# Patient Record
Sex: FEMALE | Race: WHITE | NOT HISPANIC OR LATINO | Employment: FULL TIME | URBAN - METROPOLITAN AREA
[De-identification: names, ages, dates, MRNs, and addresses within clinical notes are randomized per-mention and may not be internally consistent; named-entity substitution may affect disease eponyms.]

---

## 2023-02-27 PROBLEM — E55.9 VITAMIN D DEFICIENCY: Status: ACTIVE | Noted: 2023-02-27

## 2023-02-27 PROBLEM — Z86.32 HISTORY OF GESTATIONAL DIABETES: Status: ACTIVE | Noted: 2023-02-27

## 2023-02-27 PROBLEM — J30.9 ALLERGIC RHINITIS: Status: ACTIVE | Noted: 2023-02-27

## 2023-02-27 PROBLEM — Z91.89 AT HIGH RISK FOR BREAST CANCER: Status: ACTIVE | Noted: 2023-02-27

## 2023-02-27 PROBLEM — Z00.00 ROUTINE ADULT HEALTH MAINTENANCE: Chronic | Status: ACTIVE | Noted: 2023-02-27

## 2023-02-27 PROBLEM — I10 HYPERTENSION, ESSENTIAL: Status: ACTIVE | Noted: 2023-02-27

## 2023-02-27 PROBLEM — Z00.00 ROUTINE ADULT HEALTH MAINTENANCE: Status: ACTIVE | Noted: 2023-02-27

## 2023-02-27 PROBLEM — L70.9 ACNE, MILD: Status: ACTIVE | Noted: 2023-02-27

## 2023-03-15 DIAGNOSIS — E55.9 VITAMIN D DEFICIENCY: Primary | ICD-10-CM

## 2023-03-15 RX ORDER — ERGOCALCIFEROL 1.25 MG/1
CAPSULE ORAL
COMMUNITY
End: 2023-03-15 | Stop reason: SDUPTHER

## 2023-03-15 RX ORDER — ERGOCALCIFEROL 1.25 MG/1
CAPSULE ORAL
Qty: 6 CAPSULE | Refills: 1 | Status: SHIPPED | OUTPATIENT
Start: 2023-03-15 | End: 2023-04-14 | Stop reason: SDUPTHER

## 2023-03-27 DIAGNOSIS — I10 HYPERTENSION, ESSENTIAL: ICD-10-CM

## 2023-03-27 DIAGNOSIS — L70.9 ACNE, MILD: ICD-10-CM

## 2023-03-27 RX ORDER — SPIRONOLACTONE 50 MG/1
50 TABLET, FILM COATED ORAL DAILY
Qty: 30 TABLET | Refills: 1 | Status: CANCELLED | OUTPATIENT
Start: 2023-03-27

## 2023-03-27 RX ORDER — SPIRONOLACTONE 50 MG/1
50 TABLET, FILM COATED ORAL DAILY
COMMUNITY
End: 2023-03-27 | Stop reason: SDUPTHER

## 2023-03-28 ENCOUNTER — TELEPHONE (OUTPATIENT)
Dept: PRIMARY CARE | Facility: CLINIC | Age: 48
End: 2023-03-28
Payer: COMMERCIAL

## 2023-03-28 DIAGNOSIS — Z00.00 ROUTINE MEDICAL EXAM: ICD-10-CM

## 2023-03-28 RX ORDER — SPIRONOLACTONE 50 MG/1
50 TABLET, FILM COATED ORAL DAILY
Qty: 90 TABLET | Refills: 3 | Status: SHIPPED | OUTPATIENT
Start: 2023-03-28 | End: 2023-04-14 | Stop reason: SDUPTHER

## 2023-04-14 ENCOUNTER — LAB (OUTPATIENT)
Dept: LAB | Facility: LAB | Age: 48
End: 2023-04-14
Payer: COMMERCIAL

## 2023-04-14 ENCOUNTER — OFFICE VISIT (OUTPATIENT)
Dept: PRIMARY CARE | Facility: CLINIC | Age: 48
End: 2023-04-14
Payer: COMMERCIAL

## 2023-04-14 VITALS
HEART RATE: 86 BPM | HEIGHT: 65 IN | WEIGHT: 168 LBS | SYSTOLIC BLOOD PRESSURE: 126 MMHG | DIASTOLIC BLOOD PRESSURE: 82 MMHG | BODY MASS INDEX: 27.99 KG/M2

## 2023-04-14 DIAGNOSIS — I10 HYPERTENSION, ESSENTIAL: ICD-10-CM

## 2023-04-14 DIAGNOSIS — Z91.89 AT HIGH RISK FOR BREAST CANCER: ICD-10-CM

## 2023-04-14 DIAGNOSIS — E55.9 VITAMIN D DEFICIENCY: ICD-10-CM

## 2023-04-14 DIAGNOSIS — Z12.31 ENCOUNTER FOR SCREENING MAMMOGRAM FOR BREAST CANCER: ICD-10-CM

## 2023-04-14 DIAGNOSIS — L70.9 ACNE, MILD: ICD-10-CM

## 2023-04-14 DIAGNOSIS — Z00.00 ROUTINE MEDICAL EXAM: ICD-10-CM

## 2023-04-14 DIAGNOSIS — Z00.00 ROUTINE ADULT HEALTH MAINTENANCE: Primary | Chronic | ICD-10-CM

## 2023-04-14 LAB
ALANINE AMINOTRANSFERASE (SGPT) (U/L) IN SER/PLAS: 10 U/L (ref 7–45)
ALBUMIN (G/DL) IN SER/PLAS: 4.3 G/DL (ref 3.4–5)
ALBUMIN (MG/L) IN URINE: <7 MG/L
ALBUMIN/CREATININE (UG/MG) IN URINE: NORMAL UG/MG CRT (ref 0–30)
ALKALINE PHOSPHATASE (U/L) IN SER/PLAS: 51 U/L (ref 33–110)
ANION GAP IN SER/PLAS: 11 MMOL/L (ref 10–20)
APPEARANCE, URINE: NORMAL
ASPARTATE AMINOTRANSFERASE (SGOT) (U/L) IN SER/PLAS: 16 U/L (ref 9–39)
BASOPHILS (10*3/UL) IN BLOOD BY AUTOMATED COUNT: 0.04 X10E9/L (ref 0–0.1)
BASOPHILS/100 LEUKOCYTES IN BLOOD BY AUTOMATED COUNT: 0.7 % (ref 0–2)
BILIRUBIN TOTAL (MG/DL) IN SER/PLAS: 0.6 MG/DL (ref 0–1.2)
BILIRUBIN, URINE: NEGATIVE
BLOOD, URINE: NEGATIVE
CALCIDIOL (25 OH VITAMIN D3) (NG/ML) IN SER/PLAS: 34 NG/ML
CALCIUM (MG/DL) IN SER/PLAS: 9.1 MG/DL (ref 8.6–10.3)
CARBON DIOXIDE, TOTAL (MMOL/L) IN SER/PLAS: 26 MMOL/L (ref 21–32)
CHLORIDE (MMOL/L) IN SER/PLAS: 106 MMOL/L (ref 98–107)
CHOLESTEROL (MG/DL) IN SER/PLAS: 168 MG/DL (ref 0–199)
CHOLESTEROL IN HDL (MG/DL) IN SER/PLAS: 63 MG/DL
CHOLESTEROL/HDL RATIO: 2.7
COLOR, URINE: YELLOW
CREATININE (MG/DL) IN SER/PLAS: 0.66 MG/DL (ref 0.5–1.05)
CREATININE (MG/DL) IN URINE: 95.1 MG/DL (ref 20–320)
EOSINOPHILS (10*3/UL) IN BLOOD BY AUTOMATED COUNT: 0.09 X10E9/L (ref 0–0.7)
EOSINOPHILS/100 LEUKOCYTES IN BLOOD BY AUTOMATED COUNT: 1.6 % (ref 0–6)
ERYTHROCYTE DISTRIBUTION WIDTH (RATIO) BY AUTOMATED COUNT: 12.7 % (ref 11.5–14.5)
ERYTHROCYTE MEAN CORPUSCULAR HEMOGLOBIN CONCENTRATION (G/DL) BY AUTOMATED: 32.6 G/DL (ref 32–36)
ERYTHROCYTE MEAN CORPUSCULAR VOLUME (FL) BY AUTOMATED COUNT: 93 FL (ref 80–100)
ERYTHROCYTES (10*6/UL) IN BLOOD BY AUTOMATED COUNT: 4.63 X10E12/L (ref 4–5.2)
GFR FEMALE: >90 ML/MIN/1.73M2
GLUCOSE (MG/DL) IN SER/PLAS: 85 MG/DL (ref 74–99)
GLUCOSE, URINE: NEGATIVE MG/DL
HEMATOCRIT (%) IN BLOOD BY AUTOMATED COUNT: 43 % (ref 36–46)
HEMOGLOBIN (G/DL) IN BLOOD: 14 G/DL (ref 12–16)
IMMATURE GRANULOCYTES/100 LEUKOCYTES IN BLOOD BY AUTOMATED COUNT: 0.4 % (ref 0–0.9)
KETONES, URINE: NEGATIVE MG/DL
LDL: 91 MG/DL (ref 0–99)
LEUKOCYTE ESTERASE, URINE: NEGATIVE
LEUKOCYTES (10*3/UL) IN BLOOD BY AUTOMATED COUNT: 5.5 X10E9/L (ref 4.4–11.3)
LYMPHOCYTES (10*3/UL) IN BLOOD BY AUTOMATED COUNT: 2.54 X10E9/L (ref 1.2–4.8)
LYMPHOCYTES/100 LEUKOCYTES IN BLOOD BY AUTOMATED COUNT: 45.8 % (ref 13–44)
MONOCYTES (10*3/UL) IN BLOOD BY AUTOMATED COUNT: 0.35 X10E9/L (ref 0.1–1)
MONOCYTES/100 LEUKOCYTES IN BLOOD BY AUTOMATED COUNT: 6.3 % (ref 2–10)
NEUTROPHILS (10*3/UL) IN BLOOD BY AUTOMATED COUNT: 2.5 X10E9/L (ref 1.2–7.7)
NEUTROPHILS/100 LEUKOCYTES IN BLOOD BY AUTOMATED COUNT: 45.2 % (ref 40–80)
NITRITE, URINE: NEGATIVE
PH, URINE: 7 (ref 5–8)
PLATELETS (10*3/UL) IN BLOOD AUTOMATED COUNT: 240 X10E9/L (ref 150–450)
POTASSIUM (MMOL/L) IN SER/PLAS: 4.3 MMOL/L (ref 3.5–5.3)
PROTEIN TOTAL: 6.7 G/DL (ref 6.4–8.2)
PROTEIN, URINE: NEGATIVE MG/DL
SODIUM (MMOL/L) IN SER/PLAS: 139 MMOL/L (ref 136–145)
SPECIFIC GRAVITY, URINE: 1.02 (ref 1–1.03)
THYROTROPIN (MIU/L) IN SER/PLAS BY DETECTION LIMIT <= 0.05 MIU/L: 0.98 MIU/L (ref 0.44–3.98)
TRIGLYCERIDE (MG/DL) IN SER/PLAS: 72 MG/DL (ref 0–149)
UREA NITROGEN (MG/DL) IN SER/PLAS: 11 MG/DL (ref 6–23)
UROBILINOGEN, URINE: <2 MG/DL (ref 0–1.9)
VLDL: 14 MG/DL (ref 0–40)

## 2023-04-14 PROCEDURE — 82306 VITAMIN D 25 HYDROXY: CPT

## 2023-04-14 PROCEDURE — 85025 COMPLETE CBC W/AUTO DIFF WBC: CPT

## 2023-04-14 PROCEDURE — 3074F SYST BP LT 130 MM HG: CPT | Performed by: INTERNAL MEDICINE

## 2023-04-14 PROCEDURE — 84443 ASSAY THYROID STIM HORMONE: CPT

## 2023-04-14 PROCEDURE — 82570 ASSAY OF URINE CREATININE: CPT

## 2023-04-14 PROCEDURE — 81003 URINALYSIS AUTO W/O SCOPE: CPT

## 2023-04-14 PROCEDURE — 1036F TOBACCO NON-USER: CPT | Performed by: INTERNAL MEDICINE

## 2023-04-14 PROCEDURE — 80053 COMPREHEN METABOLIC PANEL: CPT

## 2023-04-14 PROCEDURE — 82043 UR ALBUMIN QUANTITATIVE: CPT

## 2023-04-14 PROCEDURE — 36415 COLL VENOUS BLD VENIPUNCTURE: CPT

## 2023-04-14 PROCEDURE — 99396 PREV VISIT EST AGE 40-64: CPT | Performed by: INTERNAL MEDICINE

## 2023-04-14 PROCEDURE — 3079F DIAST BP 80-89 MM HG: CPT | Performed by: INTERNAL MEDICINE

## 2023-04-14 PROCEDURE — 80061 LIPID PANEL: CPT

## 2023-04-14 RX ORDER — ERGOCALCIFEROL 1.25 MG/1
CAPSULE ORAL
Qty: 6 CAPSULE | Refills: 1 | Status: SHIPPED | OUTPATIENT
Start: 2023-04-14 | End: 2023-04-14 | Stop reason: SDUPTHER

## 2023-04-14 RX ORDER — SPIRONOLACTONE 50 MG/1
50 TABLET, FILM COATED ORAL DAILY
Qty: 90 TABLET | Refills: 3 | Status: SHIPPED | OUTPATIENT
Start: 2023-04-14 | End: 2024-05-10 | Stop reason: SDUPTHER

## 2023-04-14 RX ORDER — ERGOCALCIFEROL 1.25 MG/1
CAPSULE ORAL
Qty: 6 CAPSULE | Refills: 3 | Status: SHIPPED | OUTPATIENT
Start: 2023-04-14 | End: 2024-05-10 | Stop reason: SDUPTHER

## 2023-04-14 ASSESSMENT — PATIENT HEALTH QUESTIONNAIRE - PHQ9
1. LITTLE INTEREST OR PLEASURE IN DOING THINGS: NOT AT ALL
2. FEELING DOWN, DEPRESSED OR HOPELESS: NOT AT ALL
SUM OF ALL RESPONSES TO PHQ9 QUESTIONS 1 AND 2: 0

## 2023-04-14 NOTE — PROGRESS NOTES
Reason for Visit: Annual Physical Exam    Assessment and Plan:  Problem List Items Addressed This Visit          High    Routine adult health maintenance - Primary (Chronic)    Overview     COVID-19 Pfizer 2/17/21, 3/10/21, 11/5/21, 10/25/22  Hepatitis B Adult1/1/2003   Influenza Vaccine, Split-Non 10/1/19, 9/1/20, 10/1/21, 10/25/22  Tdap (Age 7+)9/30/2011  Td 5/3/21  Mamm 3/19/18; 12/10/19; 10/8/21  MRI breast 12/28/20 (-); 11/22 (-)  MRI breast 3/20/19; 12/26/20  PAP 2/15/16 (-); PAP/HPV 6/26/20 (-)  HPV 2010 (-)  Cscope 8/2/21 (10yrs)         Current Assessment & Plan     Annual Wellness exam completed   Preventive Health history reviewed:  Vaccines today: none  Labs ordered    Mammogram ordered  Depression Screening done         Relevant Orders    Urinalysis with Reflex Microscopic    Comprehensive Metabolic Panel    CBC and Auto Differential    Lipid Panel       Medium    Acne, mild    Overview     Seeing Derm  On RetinA and topical  Aldactone started 4/21         Relevant Medications    spironolactone (Aldactone) 50 mg tablet    Other Relevant Orders    TSH with reflex to Free T4 if abnormal    At high risk for breast cancer    Overview     Five Year Risk:3.8%(Average: 0.8%)  Lifetime Risk:28.5%(Average: 12.1%)  MRI and mamm every 6months  MRI: 11/22: No MRI evidence of malignancy in either breast.  12/28/20: IMPRESSION: BENIGN FINDING   No MRI evidence of malignancy involving either breast.   Follow-up with ACR/NCCN guidelines.  2016: LEFT BREAST: BI-RADS 0  1. Lobular mass in the slightly lower slightly outer breast at a middle   depth. Possible lymph node. Second look ultrasound is recommended. If   there is no sonographic correlate, a six month follow up MRI is   recommended.  2. 6mm focal area of non-mass enhancement in the slightly upper slightly   inner quadrant at a posterior depth. Second look ultrasound may be   performed. If a clear and convincing correlate is not identified, MRI   guided biopsy is  "recommended.         Relevant Orders    MR breast bilateral w contrast full protocol    Encounter for screening mammogram for breast cancer    Relevant Orders    BI mammo bilateral screening tomosynthesis    Hypertension, essential    Overview     Goal < 130/80  5/3/21: ours:147/90 hr 73              hers: 149/96 hr 72  On Aldactone         Relevant Medications    spironolactone (Aldactone) 50 mg tablet    Other Relevant Orders    TSH with reflex to Free T4 if abnormal    Albumin, urine, random    Vitamin D deficiency    Overview     Goal 40-50  on supplement         Relevant Medications    ergocalciferol (Vitamin D-2) 1.25 MG (42867 UT) capsule    Other Relevant Orders    Vitamin D, Total         HPI: Denies any concerns today.   No new issues    ROS otherwise negative aside from what was mentioned above in HPI.    Vitals  /82   Pulse 86   Ht 1.638 m (5' 4.5\")   Wt 76.2 kg (168 lb)   BMI 28.39 kg/m²   Body mass index is 28.39 kg/m².  Physical Exam  Gen: Alert, NAD  HEENT:  Normal exam  Neck:  No masses/nodes palpable; Thyroid nontender and without nodules; No HOLLY  Respiratory:  Lungs CTAB  CV: RRR  Neuro:  Gross motor and sensory intact  Skin:  No suspicious lesions present  Breast: No masses or axillary lymphadenopathy  Gyn: Normal pelvic exam: no uterine masses or cervical lesions, or CMT; no vaginal D/C. No ovarian or adnexal masses; No external vaginal or anal/perineal lesions (Pt declined chaperone)    Active Problem List  Patient Active Problem List   Diagnosis    Acne, mild    Allergic rhinitis    History of gestational diabetes    Hypertension, essential    Vitamin D deficiency    Routine adult health maintenance    BMI 28.0-28.9,adult    At high risk for breast cancer    Encounter for screening mammogram for breast cancer       Comprehensive Medical/Surgical/Social/Family History  Past Medical History:   Diagnosis Date    H/O chest x-ray     2017 (-)    H/O diagnostic ultrasound     2/17: " IMPRESSION: NEEDLE LOCALIZATION Needle localization for the marker clip in the left breast at 11 o'clock  posterior depth 5.7 cm from the skin to the geometric center of lesion  was successful with no apparent post procedure complications    H/O magnetic resonance imaging     : No MRI evidence of malignancy in either breast. 20: IMPRESSION: BENIGN FINDING  No MRI evidence of malignancy involving either breast.  Follow-up with ACR/NCCN guidelines. 2016: LEFT BREAST: BI-RADS 0 1. Lobular mass in the slightly lower slightly outer breast at a middle  depth. Possible lymph node. Second look ultrasound is recommended.     Past Surgical History:   Procedure Laterality Date     SECTION, CLASSIC  08/10/2018     Section    COLONOSCOPY      Cscope 21: normal (10yrs)    INCISIONAL BREAST BIOPSY  2019: Left breast, excisional biopsy  - Fibrocystic changes including microcysts, fibrosis, apocrine metaplasia,  and usual ductal hyperplasia :  1. 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 (Pt's daughter was BRCA tested and was (-))    MGM:  Breast Cancer  age 70s, CVA age 90s, Uterine CA on Tamoxifen()    PGF: DVT after surgery    MGF: CVA and Brain CA ()    B:                                                            S:                                                            D: nephrectomy secondary to hydronephrosis, Hypertension       Allergies and Medications  Patient has no known allergies.  Current Outpatient Medications   Medication Instructions    ergocalciferol (Vitamin D-2) 1.25 MG (28004 UT) capsule TAKE 1 CAPSULE BY MOUTH EVERY 2 WEEKS    spironolactone (ALDACTONE) 50 mg, oral, Daily

## 2023-04-14 NOTE — ASSESSMENT & PLAN NOTE
Annual Wellness exam completed   Preventive Health history reviewed:  Vaccines today: none  Labs ordered    Mammogram ordered  Depression Screening done

## 2023-12-15 ENCOUNTER — HOSPITAL ENCOUNTER (OUTPATIENT)
Dept: RADIOLOGY | Facility: HOSPITAL | Age: 48
Discharge: HOME | End: 2023-12-15
Payer: COMMERCIAL

## 2023-12-15 DIAGNOSIS — Z91.89 AT HIGH RISK FOR BREAST CANCER: ICD-10-CM

## 2023-12-15 PROCEDURE — A9575 INJ GADOTERATE MEGLUMI 0.1ML: HCPCS | Performed by: INTERNAL MEDICINE

## 2023-12-15 PROCEDURE — 77049 MRI BREAST C-+ W/CAD BI: CPT | Mod: BILATERAL PROCEDURE | Performed by: STUDENT IN AN ORGANIZED HEALTH CARE EDUCATION/TRAINING PROGRAM

## 2023-12-15 PROCEDURE — 77049 MRI BREAST C-+ W/CAD BI: CPT

## 2023-12-15 PROCEDURE — 2550000001 HC RX 255 CONTRASTS: Performed by: INTERNAL MEDICINE

## 2023-12-15 RX ORDER — GADOTERATE MEGLUMINE 376.9 MG/ML
15 INJECTION INTRAVENOUS
Status: COMPLETED | OUTPATIENT
Start: 2023-12-15 | End: 2023-12-15

## 2023-12-15 RX ADMIN — GADOTERATE MEGLUMINE 15 ML: 376.9 INJECTION INTRAVENOUS at 11:51

## 2024-04-05 ENCOUNTER — APPOINTMENT (OUTPATIENT)
Dept: PRIMARY CARE | Facility: CLINIC | Age: 49
End: 2024-04-05
Payer: COMMERCIAL

## 2024-05-10 ENCOUNTER — OFFICE VISIT (OUTPATIENT)
Dept: PRIMARY CARE | Facility: CLINIC | Age: 49
End: 2024-05-10
Payer: COMMERCIAL

## 2024-05-10 VITALS
HEART RATE: 78 BPM | DIASTOLIC BLOOD PRESSURE: 73 MMHG | SYSTOLIC BLOOD PRESSURE: 112 MMHG | BODY MASS INDEX: 30.05 KG/M2 | WEIGHT: 176 LBS | HEIGHT: 64 IN

## 2024-05-10 DIAGNOSIS — E55.9 VITAMIN D DEFICIENCY: ICD-10-CM

## 2024-05-10 DIAGNOSIS — Z12.31 ENCOUNTER FOR SCREENING MAMMOGRAM FOR BREAST CANCER: Primary | ICD-10-CM

## 2024-05-10 DIAGNOSIS — I10 HYPERTENSION, ESSENTIAL: ICD-10-CM

## 2024-05-10 DIAGNOSIS — L70.9 ACNE, MILD: ICD-10-CM

## 2024-05-10 DIAGNOSIS — Z00.00 ROUTINE ADULT HEALTH MAINTENANCE: Chronic | ICD-10-CM

## 2024-05-10 DIAGNOSIS — Z91.89 AT HIGH RISK FOR BREAST CANCER: ICD-10-CM

## 2024-05-10 DIAGNOSIS — J30.2 SEASONAL ALLERGIC RHINITIS, UNSPECIFIED TRIGGER: ICD-10-CM

## 2024-05-10 PROCEDURE — 3074F SYST BP LT 130 MM HG: CPT | Performed by: INTERNAL MEDICINE

## 2024-05-10 PROCEDURE — 3078F DIAST BP <80 MM HG: CPT | Performed by: INTERNAL MEDICINE

## 2024-05-10 PROCEDURE — 1036F TOBACCO NON-USER: CPT | Performed by: INTERNAL MEDICINE

## 2024-05-10 PROCEDURE — 99396 PREV VISIT EST AGE 40-64: CPT | Performed by: INTERNAL MEDICINE

## 2024-05-10 RX ORDER — AZELASTINE 1 MG/ML
2 SPRAY, METERED NASAL EVERY 12 HOURS
Qty: 30 ML | Refills: 11 | Status: SHIPPED | OUTPATIENT
Start: 2024-05-10

## 2024-05-10 RX ORDER — ERGOCALCIFEROL 1.25 MG/1
CAPSULE ORAL
Qty: 6 CAPSULE | Refills: 3 | Status: SHIPPED | OUTPATIENT
Start: 2024-05-10

## 2024-05-10 RX ORDER — SPIRONOLACTONE 50 MG/1
50 TABLET, FILM COATED ORAL DAILY
Qty: 90 TABLET | Refills: 3 | Status: SHIPPED | OUTPATIENT
Start: 2024-05-10

## 2024-05-10 RX ORDER — AZELASTINE 1 MG/ML
2 SPRAY, METERED NASAL EVERY 12 HOURS
COMMUNITY
Start: 2021-08-04 | End: 2024-05-10 | Stop reason: SDUPTHER

## 2024-05-10 ASSESSMENT — PATIENT HEALTH QUESTIONNAIRE - PHQ9
1. LITTLE INTEREST OR PLEASURE IN DOING THINGS: NOT AT ALL
SUM OF ALL RESPONSES TO PHQ9 QUESTIONS 1 AND 2: 0
2. FEELING DOWN, DEPRESSED OR HOPELESS: NOT AT ALL

## 2024-05-10 NOTE — ASSESSMENT & PLAN NOTE
Annual Wellness exam completed   Preventive Health History reviewed  Ordered:   Labs    Mammogram/MRI   PAP due in 2025  Shingrix dicussed

## 2024-05-10 NOTE — PROGRESS NOTES
ANNUAL CPE VISIT  Chief Complaint   Patient presents with    Annual Exam     refills     HPI: Bp is good    Assessment and Plan:  Problem List Items Addressed This Visit          High    Routine adult health maintenance (Chronic)    Overview     COVID-19 Pfizer 2/17/21, 3/10/21, 11/5/21, 10/25/22  Hepatitis B Adult1/1/2003   Influenza Vaccine, Split-Non 10/1/19, 9/1/20, 10/1/21, 10/25/22, 10/28/23  Tdap (Age 7+)9/30/2011  Td 5/3/21  Mamm 3/19/18; 12/10/19; 10/8/21, 6/20/23  MRI breast 12/28/20 (-); 11/22 (-), 3/20/19; 12/26/20, 12/15/23 (-)  PAP 2/15/16 (-); PAP/HPV 6/26/20 (-)  HPV 2010 (-)  Cscope 8/2/21 (10yrs)         Current Assessment & Plan     Annual Wellness exam completed   Preventive Health History reviewed  Ordered:   Labs    Mammogram/MRI   PAP due in 2025  Shingrix dicussed         Relevant Orders    Comprehensive Metabolic Panel    CBC and Auto Differential    Lipid Panel    Urinalysis with Reflex Culture and Microscopic    Comprehensive Metabolic Panel    CBC and Auto Differential    Lipid Panel    Urinalysis with Reflex Culture and Microscopic       Medium    Acne, mild    Overview     Seeing Derm  On RetinA and topical  Aldactone started 4/21         Relevant Medications    spironolactone (Aldactone) 50 mg tablet    Allergic rhinitis    Overview     S/p allergy testing (-)  Saw Allergist  Uses astelin         Relevant Medications    azelastine (Astelin) 137 mcg (0.1 %) nasal spray    At high risk for breast cancer    Overview     Five Year Risk:3.8%(Average: 0.8%)  Lifetime Risk:28.5%(Average: 12.1%)  MRI and mamm every 6months  MRI: 11/22: No MRI evidence of malignancy in either breast.  12/28/20: IMPRESSION: BENIGN FINDING   No MRI evidence of malignancy involving either breast.   Follow-up with ACR/NCCN guidelines.  2016: LEFT BREAST: BI-RADS 0  1. Lobular mass in the slightly lower slightly outer breast at a middle   depth. Possible lymph node. Second look ultrasound is recommended. If   there  "is no sonographic correlate, a six month follow up MRI is   recommended.  2. 6mm focal area of non-mass enhancement in the slightly upper slightly   inner quadrant at a posterior depth. Second look ultrasound may be   performed. If a clear and convincing correlate is not identified, MRI   guided biopsy is recommended.         Relevant Orders    MR breast bilateral w contrast full protocol    Encounter for screening mammogram for breast cancer - Primary    Relevant Orders    BI mammo bilateral screening tomosynthesis    Hypertension, essential    Overview     Goal < 130/80  5/3/21: ours:147/90 hr 73              hers: 149/96 hr 72  On Aldactone         Relevant Medications    spironolactone (Aldactone) 50 mg tablet    Other Relevant Orders    TSH with reflex to Free T4 if abnormal    TSH with reflex to Free T4 if abnormal    Albumin , Urine Random    Albumin, urine, random    Vitamin D deficiency    Overview     Goal 40-50  on supplement         Relevant Medications    ergocalciferol (Vitamin D-2) 1.25 MG (75033 UT) capsule    Other Relevant Orders    Vitamin D 25-Hydroxy,Total (for eval of Vitamin D levels)    Vitamin D 25-Hydroxy,Total (for eval of Vitamin D levels)       ROS otherwise negative aside from what was mentioned above in HPI.    Vitals  /73   Pulse 78   Ht 1.632 m (5' 4.25\")   BMI 28.61 kg/m²   Body mass index is 28.61 kg/m².  Physical Exam  Gen: Alert, NAD  HEENT:  Normal exam  Neck:  No masses/nodes palpable; Thyroid nontender and without nodules; No HOLLY  Respiratory:  Lungs CTAB  CV: RRR  Neuro:  Gross motor and sensory intact  Skin:  No suspicious lesions present  Breast: No masses or axillary lymphadenopathy  Gyn: Normal pelvic exam: no uterine masses or cervical lesions, or CMT; no vaginal D/C. No ovarian or adnexal masses; No external vaginal or anal/perineal lesions (Pt declined chaperone)      Allergies and Medications  Patient has no known allergies.  Current Outpatient Medications "   Medication Instructions    azelastine (Astelin) 137 mcg (0.1 %) nasal spray 2 sprays, Each Nostril, Every 12 hours    ergocalciferol (Vitamin D-2) 1.25 MG (46763 UT) capsule TAKE 1 CAPSULE BY MOUTH EVERY 2 WEEKS    spironolactone (ALDACTONE) 50 mg, oral, Daily       Active Problem List  Patient Active Problem List   Diagnosis    Acne, mild    Allergic rhinitis    History of gestational diabetes    Hypertension, essential    Vitamin D deficiency    Routine adult health maintenance    BMI 28.0-28.9,adult    At high risk for breast cancer    Encounter for screening mammogram for breast cancer       Comprehensive Medical/Surgical/Social/Family History  Past Medical History:   Diagnosis Date    H/O chest x-ray      (-)    H/O diagnostic ultrasound     : IMPRESSION: NEEDLE LOCALIZATION Needle localization for the marker clip in the left breast at 11 o'clock  posterior depth 5.7 cm from the skin to the geometric center of lesion  was successful with no apparent post procedure complications    H/O magnetic resonance imaging     : No MRI evidence of malignancy in either breast. 20: IMPRESSION: BENIGN FINDING  No MRI evidence of malignancy involving either breast.  Follow-up with ACR/NCCN guidelines. 2016: LEFT BREAST: BI-RADS 0 1. Lobular mass in the slightly lower slightly outer breast at a middle  depth. Possible lymph node. Second look ultrasound is recommended.     Past Surgical History:   Procedure Laterality Date     SECTION, CLASSIC  08/10/2018     Section    COLONOSCOPY      Cscope 21: normal (10yrs)    INCISIONAL BREAST BIOPSY  2019: Left breast, excisional biopsy  - Fibrocystic changes including microcysts, fibrosis, apocrine metaplasia,  and usual ductal hyperplasia :  1. Breast, left, 11 o'clock, MRI-guided core biopsy with hourglass clip -  Fibrocystic changes including stromal fibrosis, columnar cell change,  apocrine cysts, and a small intraductal  papilloma    OTHER SURGICAL HISTORY  08/10/2018    Colposcopy With Loop Electrode Excision Of The Cervix    OTHER SURGICAL HISTORY  08/10/2018    Oral Surgery Tooth Extraction [facility]       Social History     Social History Narrative    Social History    , 3 kids (2 living)    Special  in [employer]    Nonsmoker    Occ ETOH    ---    Family History:    Daughter:  superior sagittal sinus thrombosis age 4    F: Hypertension, oral CA, CHF, cataract, CVA                               M: Breast CA x2, recurrent/contralateral, age 63, HTN. (Pt's daughter was BRCA tested and was (-))    MGM:  Breast Cancer  age 70s, CVA age 90s, Uterine CA on Tamoxifen()    PGF: DVT after surgery    MGF: CVA and Brain CA ()    B:                                                           S:                                                            D: nephrectomy secondary to hydronephrosis, Hypertension

## 2024-05-13 ENCOUNTER — TELEPHONE (OUTPATIENT)
Dept: PRIMARY CARE | Facility: CLINIC | Age: 49
End: 2024-05-13
Payer: COMMERCIAL

## 2024-07-10 ENCOUNTER — LAB (OUTPATIENT)
Dept: LAB | Facility: LAB | Age: 49
End: 2024-07-10
Payer: COMMERCIAL

## 2024-07-10 DIAGNOSIS — Z00.00 ROUTINE ADULT HEALTH MAINTENANCE: ICD-10-CM

## 2024-07-10 DIAGNOSIS — I10 HYPERTENSION, ESSENTIAL: ICD-10-CM

## 2024-07-10 DIAGNOSIS — E55.9 VITAMIN D DEFICIENCY: ICD-10-CM

## 2024-07-10 LAB
25(OH)D3 SERPL-MCNC: 44 NG/ML (ref 30–100)
ALBUMIN SERPL BCP-MCNC: 4.5 G/DL (ref 3.4–5)
ALP SERPL-CCNC: 69 U/L (ref 33–110)
ALT SERPL W P-5'-P-CCNC: 12 U/L (ref 7–45)
ANION GAP SERPL CALC-SCNC: 11 MMOL/L (ref 10–20)
APPEARANCE UR: CLEAR
AST SERPL W P-5'-P-CCNC: 16 U/L (ref 9–39)
BASOPHILS # BLD AUTO: 0.06 X10*3/UL (ref 0–0.1)
BASOPHILS NFR BLD AUTO: 1 %
BILIRUB SERPL-MCNC: 0.6 MG/DL (ref 0–1.2)
BILIRUB UR STRIP.AUTO-MCNC: NEGATIVE MG/DL
BUN SERPL-MCNC: 15 MG/DL (ref 6–23)
CALCIUM SERPL-MCNC: 9.7 MG/DL (ref 8.6–10.6)
CHLORIDE SERPL-SCNC: 104 MMOL/L (ref 98–107)
CHOLEST SERPL-MCNC: 179 MG/DL (ref 0–199)
CHOLESTEROL/HDL RATIO: 2.4
CO2 SERPL-SCNC: 29 MMOL/L (ref 21–32)
COLOR UR: YELLOW
CREAT SERPL-MCNC: 0.72 MG/DL (ref 0.5–1.05)
CREAT UR-MCNC: 179.7 MG/DL (ref 20–320)
EGFRCR SERPLBLD CKD-EPI 2021: >90 ML/MIN/1.73M*2
EOSINOPHIL # BLD AUTO: 0.14 X10*3/UL (ref 0–0.7)
EOSINOPHIL NFR BLD AUTO: 2.2 %
ERYTHROCYTE [DISTWIDTH] IN BLOOD BY AUTOMATED COUNT: 12.5 % (ref 11.5–14.5)
GLUCOSE SERPL-MCNC: 91 MG/DL (ref 74–99)
GLUCOSE UR STRIP.AUTO-MCNC: NORMAL MG/DL
HCT VFR BLD AUTO: 42.5 % (ref 36–46)
HDLC SERPL-MCNC: 73.5 MG/DL
HGB BLD-MCNC: 14.2 G/DL (ref 12–16)
IMM GRANULOCYTES # BLD AUTO: 0.03 X10*3/UL (ref 0–0.7)
IMM GRANULOCYTES NFR BLD AUTO: 0.5 % (ref 0–0.9)
KETONES UR STRIP.AUTO-MCNC: NEGATIVE MG/DL
LDLC SERPL CALC-MCNC: 89 MG/DL
LEUKOCYTE ESTERASE UR QL STRIP.AUTO: ABNORMAL
LYMPHOCYTES # BLD AUTO: 3.04 X10*3/UL (ref 1.2–4.8)
LYMPHOCYTES NFR BLD AUTO: 48.2 %
MCH RBC QN AUTO: 29.8 PG (ref 26–34)
MCHC RBC AUTO-ENTMCNC: 33.4 G/DL (ref 32–36)
MCV RBC AUTO: 89 FL (ref 80–100)
MICROALBUMIN UR-MCNC: 8.8 MG/L
MICROALBUMIN/CREAT UR: 4.9 UG/MG CREAT
MONOCYTES # BLD AUTO: 0.45 X10*3/UL (ref 0.1–1)
MONOCYTES NFR BLD AUTO: 7.1 %
MUCOUS THREADS #/AREA URNS AUTO: ABNORMAL /LPF
NEUTROPHILS # BLD AUTO: 2.59 X10*3/UL (ref 1.2–7.7)
NEUTROPHILS NFR BLD AUTO: 41 %
NITRITE UR QL STRIP.AUTO: NEGATIVE
NON HDL CHOLESTEROL: 106 MG/DL (ref 0–149)
NRBC BLD-RTO: 0 /100 WBCS (ref 0–0)
PH UR STRIP.AUTO: 5.5 [PH]
PLATELET # BLD AUTO: 261 X10*3/UL (ref 150–450)
POTASSIUM SERPL-SCNC: 4 MMOL/L (ref 3.5–5.3)
PROT SERPL-MCNC: 7.1 G/DL (ref 6.4–8.2)
PROT UR STRIP.AUTO-MCNC: NEGATIVE MG/DL
RBC # BLD AUTO: 4.76 X10*6/UL (ref 4–5.2)
RBC # UR STRIP.AUTO: ABNORMAL /UL
RBC #/AREA URNS AUTO: ABNORMAL /HPF
SODIUM SERPL-SCNC: 140 MMOL/L (ref 136–145)
SP GR UR STRIP.AUTO: 1.02
SQUAMOUS #/AREA URNS AUTO: ABNORMAL /HPF
TRIGL SERPL-MCNC: 81 MG/DL (ref 0–149)
TSH SERPL-ACNC: 1.68 MIU/L (ref 0.44–3.98)
UROBILINOGEN UR STRIP.AUTO-MCNC: NORMAL MG/DL
VLDL: 16 MG/DL (ref 0–40)
WBC # BLD AUTO: 6.3 X10*3/UL (ref 4.4–11.3)
WBC #/AREA URNS AUTO: ABNORMAL /HPF

## 2024-07-10 PROCEDURE — 80053 COMPREHEN METABOLIC PANEL: CPT

## 2024-07-10 PROCEDURE — 81001 URINALYSIS AUTO W/SCOPE: CPT

## 2024-07-10 PROCEDURE — 82306 VITAMIN D 25 HYDROXY: CPT

## 2024-07-10 PROCEDURE — 87086 URINE CULTURE/COLONY COUNT: CPT

## 2024-07-10 PROCEDURE — 82043 UR ALBUMIN QUANTITATIVE: CPT

## 2024-07-10 PROCEDURE — 85025 COMPLETE CBC W/AUTO DIFF WBC: CPT

## 2024-07-10 PROCEDURE — 84443 ASSAY THYROID STIM HORMONE: CPT

## 2024-07-10 PROCEDURE — 82570 ASSAY OF URINE CREATININE: CPT

## 2024-07-10 PROCEDURE — 36415 COLL VENOUS BLD VENIPUNCTURE: CPT

## 2024-07-10 PROCEDURE — 80061 LIPID PANEL: CPT

## 2024-07-11 LAB — BACTERIA UR CULT: NORMAL

## 2024-09-20 ENCOUNTER — HOSPITAL ENCOUNTER (OUTPATIENT)
Dept: RADIOLOGY | Facility: CLINIC | Age: 49
Discharge: HOME | End: 2024-09-20
Payer: COMMERCIAL

## 2024-09-20 VITALS — BODY MASS INDEX: 28.17 KG/M2 | HEIGHT: 64 IN | WEIGHT: 165 LBS

## 2024-09-20 DIAGNOSIS — Z12.31 ENCOUNTER FOR SCREENING MAMMOGRAM FOR BREAST CANCER: ICD-10-CM

## 2024-09-20 PROCEDURE — 77067 SCR MAMMO BI INCL CAD: CPT

## 2025-04-25 ENCOUNTER — APPOINTMENT (OUTPATIENT)
Dept: PRIMARY CARE | Facility: CLINIC | Age: 50
End: 2025-04-25
Payer: COMMERCIAL

## 2025-04-25 ENCOUNTER — OFFICE VISIT (OUTPATIENT)
Dept: PRIMARY CARE | Facility: CLINIC | Age: 50
End: 2025-04-25
Payer: COMMERCIAL

## 2025-04-25 VITALS
BODY MASS INDEX: 29.62 KG/M2 | HEIGHT: 65 IN | OXYGEN SATURATION: 97 % | WEIGHT: 177.8 LBS | TEMPERATURE: 96.8 F | HEART RATE: 75 BPM | DIASTOLIC BLOOD PRESSURE: 72 MMHG | SYSTOLIC BLOOD PRESSURE: 118 MMHG

## 2025-04-25 DIAGNOSIS — I10 HYPERTENSION, ESSENTIAL: ICD-10-CM

## 2025-04-25 DIAGNOSIS — L70.9 ACNE, MILD: ICD-10-CM

## 2025-04-25 DIAGNOSIS — E55.9 VITAMIN D DEFICIENCY: ICD-10-CM

## 2025-04-25 DIAGNOSIS — Z12.4 SCREENING FOR CERVICAL CANCER: ICD-10-CM

## 2025-04-25 DIAGNOSIS — N95.1 PERIMENOPAUSE: ICD-10-CM

## 2025-04-25 DIAGNOSIS — Z86.19 HISTORY OF SHINGLES: ICD-10-CM

## 2025-04-25 DIAGNOSIS — Z13.6 SCREENING FOR CARDIOVASCULAR CONDITION: ICD-10-CM

## 2025-04-25 DIAGNOSIS — J30.2 SEASONAL ALLERGIC RHINITIS, UNSPECIFIED TRIGGER: ICD-10-CM

## 2025-04-25 DIAGNOSIS — Z91.89 AT HIGH RISK FOR BREAST CANCER: ICD-10-CM

## 2025-04-25 DIAGNOSIS — Z00.00 ROUTINE ADULT HEALTH MAINTENANCE: Primary | Chronic | ICD-10-CM

## 2025-04-25 DIAGNOSIS — Z12.39 SCREENING FOR BREAST CANCER USING NON-MAMMOGRAM MODALITY: ICD-10-CM

## 2025-04-25 DIAGNOSIS — Z12.31 ENCOUNTER FOR SCREENING MAMMOGRAM FOR BREAST CANCER: ICD-10-CM

## 2025-04-25 LAB
25(OH)D3+25(OH)D2 SERPL-MCNC: 47 NG/ML (ref 30–100)
ALBUMIN SERPL-MCNC: 4.3 G/DL (ref 3.6–5.1)
ALBUMIN/CREAT UR: 3 MG/G CREAT
ALP SERPL-CCNC: 59 U/L (ref 37–153)
ALT SERPL-CCNC: 19 U/L (ref 6–29)
ANION GAP SERPL CALCULATED.4IONS-SCNC: 6 MMOL/L (CALC) (ref 7–17)
APPEARANCE UR: ABNORMAL
AST SERPL-CCNC: 21 U/L (ref 10–35)
BACTERIA #/AREA URNS HPF: ABNORMAL /HPF
BACTERIA UR CULT: ABNORMAL
BASOPHILS # BLD AUTO: 48 CELLS/UL (ref 0–200)
BASOPHILS NFR BLD AUTO: 0.8 %
BILIRUB SERPL-MCNC: 0.6 MG/DL (ref 0.2–1.2)
BILIRUB UR QL STRIP: NEGATIVE
BUN SERPL-MCNC: 10 MG/DL (ref 7–25)
CALCIUM SERPL-MCNC: 9.2 MG/DL (ref 8.6–10.4)
CHLORIDE SERPL-SCNC: 104 MMOL/L (ref 98–110)
CHOLEST SERPL-MCNC: 201 MG/DL
CHOLEST/HDLC SERPL: 2.9 (CALC)
CO2 SERPL-SCNC: 28 MMOL/L (ref 20–32)
COLOR UR: YELLOW
CREAT SERPL-MCNC: 0.64 MG/DL (ref 0.5–1.03)
CREAT UR-MCNC: 153 MG/DL (ref 20–275)
EGFRCR SERPLBLD CKD-EPI 2021: 108 ML/MIN/1.73M2
EOSINOPHIL # BLD AUTO: 132 CELLS/UL (ref 15–500)
EOSINOPHIL NFR BLD AUTO: 2.2 %
ERYTHROCYTE [DISTWIDTH] IN BLOOD BY AUTOMATED COUNT: 12.1 % (ref 11–15)
GLUCOSE SERPL-MCNC: 85 MG/DL (ref 65–99)
GLUCOSE UR QL STRIP: NEGATIVE
HCT VFR BLD AUTO: 41.2 % (ref 35–45)
HDLC SERPL-MCNC: 69 MG/DL
HGB BLD-MCNC: 14.4 G/DL (ref 11.7–15.5)
HGB UR QL STRIP: NEGATIVE
HYALINE CASTS #/AREA URNS LPF: ABNORMAL /LPF
KETONES UR QL STRIP: NEGATIVE
LDLC SERPL CALC-MCNC: 112 MG/DL (CALC)
LEUKOCYTE ESTERASE UR QL STRIP: NEGATIVE
LYMPHOCYTES # BLD AUTO: 2544 CELLS/UL (ref 850–3900)
LYMPHOCYTES NFR BLD AUTO: 42.4 %
MCH RBC QN AUTO: 31.4 PG (ref 27–33)
MCHC RBC AUTO-ENTMCNC: 35 G/DL (ref 32–36)
MCV RBC AUTO: 89.8 FL (ref 80–100)
MICROALBUMIN UR-MCNC: 0.4 MG/DL
MONOCYTES # BLD AUTO: 444 CELLS/UL (ref 200–950)
MONOCYTES NFR BLD AUTO: 7.4 %
NEUTROPHILS # BLD AUTO: 2832 CELLS/UL (ref 1500–7800)
NEUTROPHILS NFR BLD AUTO: 47.2 %
NITRITE UR QL STRIP: NEGATIVE
NONHDLC SERPL-MCNC: 132 MG/DL (CALC)
PH UR STRIP: 8.5 [PH] (ref 5–8)
PLATELET # BLD AUTO: 280 THOUSAND/UL (ref 140–400)
PMV BLD REES-ECKER: 11.6 FL (ref 7.5–12.5)
POTASSIUM SERPL-SCNC: 4.6 MMOL/L (ref 3.5–5.3)
PROT SERPL-MCNC: 6.4 G/DL (ref 6.1–8.1)
PROT UR QL STRIP: NEGATIVE
RBC # BLD AUTO: 4.59 MILLION/UL (ref 3.8–5.1)
RBC #/AREA URNS HPF: ABNORMAL /HPF
SERVICE CMNT-IMP: ABNORMAL
SODIUM SERPL-SCNC: 138 MMOL/L (ref 135–146)
SP GR UR STRIP: 1.02 (ref 1–1.03)
SQUAMOUS #/AREA URNS HPF: ABNORMAL /HPF
TRIGL SERPL-MCNC: 97 MG/DL
TSH SERPL-ACNC: 1.92 MIU/L
WBC # BLD AUTO: 6 THOUSAND/UL (ref 3.8–10.8)
WBC #/AREA URNS HPF: ABNORMAL /HPF

## 2025-04-25 PROCEDURE — 3008F BODY MASS INDEX DOCD: CPT | Performed by: INTERNAL MEDICINE

## 2025-04-25 PROCEDURE — 87626 HPV SEP HI-RSK TYP&POOL RSLT: CPT

## 2025-04-25 PROCEDURE — 1036F TOBACCO NON-USER: CPT | Performed by: INTERNAL MEDICINE

## 2025-04-25 PROCEDURE — 99396 PREV VISIT EST AGE 40-64: CPT | Performed by: INTERNAL MEDICINE

## 2025-04-25 PROCEDURE — 3078F DIAST BP <80 MM HG: CPT | Performed by: INTERNAL MEDICINE

## 2025-04-25 PROCEDURE — 3074F SYST BP LT 130 MM HG: CPT | Performed by: INTERNAL MEDICINE

## 2025-04-25 RX ORDER — SPIRONOLACTONE 50 MG/1
50 TABLET, FILM COATED ORAL DAILY
Qty: 90 TABLET | Refills: 3 | Status: SHIPPED | OUTPATIENT
Start: 2025-04-25

## 2025-04-25 RX ORDER — AZELASTINE 1 MG/ML
2 SPRAY, METERED NASAL EVERY 12 HOURS
Qty: 30 ML | Refills: 11 | Status: SHIPPED | OUTPATIENT
Start: 2025-04-25

## 2025-04-25 RX ORDER — VIT C/E/ZN/COPPR/LUTEIN/ZEAXAN 250MG-90MG
CAPSULE ORAL
Start: 2025-04-25

## 2025-04-25 RX ORDER — CLOBETASOL PROPIONATE 0.5 MG/G
CREAM TOPICAL 2 TIMES DAILY
Qty: 60 G | Refills: 0 | Status: SHIPPED | OUTPATIENT
Start: 2025-04-25 | End: 2025-12-21

## 2025-04-25 RX ORDER — ERGOCALCIFEROL 1.25 MG/1
CAPSULE ORAL
Qty: 6 CAPSULE | Refills: 3 | Status: SHIPPED | OUTPATIENT
Start: 2025-04-25

## 2025-04-25 ASSESSMENT — PATIENT HEALTH QUESTIONNAIRE - PHQ9
SUM OF ALL RESPONSES TO PHQ9 QUESTIONS 1 AND 2: 0
2. FEELING DOWN, DEPRESSED OR HOPELESS: NOT AT ALL
1. LITTLE INTEREST OR PLEASURE IN DOING THINGS: NOT AT ALL

## 2025-04-25 NOTE — PROGRESS NOTES
ANNUAL CPE VISIT  Chief Complaint   Patient presents with    Annual Exam     HPI: Reviewed chart/medical care received since last seen by me.  Had shingles last year    Having hot flashes    Labs reviewed:  Component      Latest Ref Rng 4/24/2025   WHITE BLOOD CELL COUNT      3.8 - 10.8 Thousand/uL 6.0    RED BLOOD CELL COUNT      3.80 - 5.10 Million/uL 4.59    HEMOGLOBIN      11.7 - 15.5 g/dL 14.4    HEMATOCRIT      35.0 - 45.0 % 41.2    MCV      80.0 - 100.0 fL 89.8    MCH      27.0 - 33.0 pg 31.4    MCHC      32.0 - 36.0 g/dL 35.0    RDW      11.0 - 15.0 % 12.1    PLATELET COUNT      140 - 400 Thousand/uL 280    MPV      7.5 - 12.5 fL 11.6    ABSOLUTE NEUTROPHILS      1,500 - 7,800 cells/uL 2,832    ABSOLUTE LYMPHOCYTES      850 - 3,900 cells/uL 2,544    ABSOLUTE MONOCYTES      200 - 950 cells/uL 444    ABSOLUTE EOSINOPHILS      15 - 500 cells/uL 132    ABSOLUTE BASOPHILS      0 - 200 cells/uL 48    NEUTROPHILS      % 47.2    LYMPHOCYTES      % 42.4    MONOCYTES      % 7.4    EOSINOPHILS      % 2.2    BASOPHILS      % 0.8    COLOR      YELLOW  YELLOW    APPEARANCE      CLEAR  TURBID !    SPECIFIC GRAVITY      1.001 - 1.035  1.017    PH      5.0 - 8.0  8.5 (H)    GLUCOSE      NEGATIVE  NEGATIVE    BILIRUBIN      NEGATIVE  NEGATIVE    KETONES      NEGATIVE  NEGATIVE    OCCULT BLOOD      NEGATIVE  NEGATIVE    PROTEIN      NEGATIVE  NEGATIVE    NITRITE      NEGATIVE  NEGATIVE    LEUKOCYTE ESTERASE      NEGATIVE  NEGATIVE    WBC      < OR = 5 /HPF NONE SEEN    RBC      < OR = 2 /HPF NONE SEEN    SQUAMOUS EPITHELIAL CELLS      < OR = 5 /HPF 0-5    BACTERIA      NONE SEEN /HPF NONE SEEN    HYALINE CAST      NONE SEEN /LPF NONE SEEN    NOTE --    CULTURE, URINE, ROUTINE --    GLUCOSE      65 - 99 mg/dL 85    UREA NITROGEN (BUN)      7 - 25 mg/dL 10    CREATININE      0.50 - 1.03 mg/dL 0.64    EGFR      > OR = 60 mL/min/1.73m2 108    SODIUM      135 - 146 mmol/L 138    POTASSIUM      3.5 - 5.3 mmol/L 4.6    CHLORIDE       98 - 110 mmol/L 104    CARBON DIOXIDE      20 - 32 mmol/L 28    ELECTROLYTE BALANCE      7 - 17 mmol/L (calc) 6 (L)    CALCIUM      8.6 - 10.4 mg/dL 9.2    PROTEIN, TOTAL      6.1 - 8.1 g/dL 6.4    ALBUMIN      3.6 - 5.1 g/dL 4.3    BILIRUBIN, TOTAL      0.2 - 1.2 mg/dL 0.6    ALKALINE PHOSPHATASE      37 - 153 U/L 59    AST      10 - 35 U/L 21    ALT      6 - 29 U/L 19    CHOLESTEROL, TOTAL      <200 mg/dL 201 (H)    HDL CHOLESTEROL      > OR = 50 mg/dL 69    TRIGLYCERIDES      <150 mg/dL 97    LDL-CHOLESTEROL      mg/dL (calc) 112 (H)    CHOL/HDLC RATIO      <5.0 (calc) 2.9    NON HDL CHOLESTEROL      <130 mg/dL (calc) 132 (H)    TSH      mIU/L 1.92    VITAMIN D,25-OH,TOTAL,IA      30 - 100 ng/mL 47       Assessment and Plan:  Problem List Items Addressed This Visit          High    Routine adult health maintenance - Primary (Chronic)    Overview   COVID-19 Pfizer 2/17/21, 3/10/21, 11/5/21, 10/25/22  Hepatitis B Adult1/1/2003   Influenza Vaccine, Split-Non 10/1/19, 9/1/20, 10/1/21, 10/25/22, 10/28/23, 12/10/24  Td 5/3/21  Tdap (Age 7+)9/30/2011  Mamm 3/19/18; 12/10/19; 10/8/21, 6/20/23, 9/24/24  MRI breast 12/28/20 (-); 11/22 (-), 3/20/19; 12/26/20, 12/15/23 (-)  PAP 2/15/16 (-); PAP/HPV 6/26/20 (-)  HPV 2010 (-)  Cscope 8/2/21 (10yrs)         Current Assessment & Plan   Annual Wellness exam completed   Preventive Health History reviewed  Ordered:   Labs    Mammogram   MRI Breast  PAP done           Relevant Orders    Comprehensive Metabolic Panel    CBC and Auto Differential    Lipid Panel    Urinalysis with Reflex Microscopic    Albumin-Creatinine Ratio, Urine Random       Medium    Acne, mild    Overview   Seeing Derm  On RetinA and topical  Aldactone started 4/21         Relevant Medications    spironolactone (Aldactone) 50 mg tablet    clobetasol (Temovate) 0.05 % cream    Allergic rhinitis    Overview   S/p allergy testing (-)  Saw Allergist  Uses astelin         Relevant Medications    azelastine (Astelin)  137 mcg (0.1 %) nasal spray    At high risk for breast cancer    Overview   Five Year Risk:3.8%(Average: 0.8%)  Lifetime Risk:28.5%(Average: 12.1%)  MRI and mamm every 6months  MRI: 11/22: No MRI evidence of malignancy in either breast.  12/28/20: IMPRESSION: BENIGN FINDING   No MRI evidence of malignancy involving either breast.   Follow-up with ACR/NCCN guidelines.  2016: LEFT BREAST: BI-RADS 0  1. Lobular mass in the slightly lower slightly outer breast at a middle   depth. Possible lymph node. Second look ultrasound is recommended. If   there is no sonographic correlate, a six month follow up MRI is   recommended.  2. 6mm focal area of non-mass enhancement in the slightly upper slightly   inner quadrant at a posterior depth. Second look ultrasound may be   performed. If a clear and convincing correlate is not identified, MRI   guided biopsy is recommended.         Relevant Orders    MR breast bilateral w contrast full protocol    Encounter for screening mammogram for breast cancer    Relevant Orders    BI mammo bilateral screening tomosynthesis    History of shingles    Hypertension, essential    Overview   Goal < 130/80  5/3/21: ours:147/90 hr 73              hers: 149/96 hr 72  On Aldactone         Relevant Medications    spironolactone (Aldactone) 50 mg tablet    Other Relevant Orders    CT cardiac scoring wo IV contrast    TSH with reflex to Free T4 if abnormal    Perimenopause    Current Assessment & Plan   Try Vit E         Relevant Medications    vitamin E 180 mg (400 units) capsule    Screening for breast cancer using non-mammogram modality    Relevant Orders    MR breast bilateral w contrast full protocol    Screening for cervical cancer    Relevant Orders    THINPREP PAP TEST    Vitamin D deficiency    Overview   Goal 40-50  on supplement         Relevant Medications    ergocalciferol (Vitamin D-2) 1250 mcg (50,000 units) capsule    Other Relevant Orders    Vitamin D 25-Hydroxy,Total (for eval of  "Vitamin D levels)     Other Visit Diagnoses         Screening for cardiovascular condition        Relevant Orders    CT cardiac scoring wo IV contrast            ROS otherwise negative aside from what was mentioned above in HPI.    Vitals  /72   Pulse 75   Temp 36 °C (96.8 °F)   Ht 1.638 m (5' 4.5\")   Wt 80.6 kg (177 lb 12.8 oz)   SpO2 97%   BMI 30.05 kg/m²   Body mass index is 30.05 kg/m².  Physical Exam  Gen: Alert, NAD  HEENT:  Normal exam  Neck:  No masses/nodes palpable; Thyroid nontender and without nodules; No HOLLY  Respiratory:  Lungs CTAB  CV: RRR  Neuro:  Gross motor and sensory intact  Skin:  No suspicious lesions present  Breast: No masses or axillary lymphadenopathy  Gyn: Normal pelvic exam: no uterine masses or cervical lesions, or CMT; no vaginal D/C. No ovarian or adnexal masses; No external vaginal or anal/perineal lesions (Pt declined chaperone)      Allergies and Medications  Patient has no known allergies.  Current Outpatient Medications   Medication Instructions    azelastine (Astelin) 137 mcg (0.1 %) nasal spray 2 sprays, Each Nostril, Every 12 hours    clobetasol (Temovate) 0.05 % cream Topical, 2 times daily    ergocalciferol (Vitamin D-2) 1250 mcg (50,000 units) capsule TAKE 1 CAPSULE BY MOUTH EVERY 2 WEEKS    spironolactone (ALDACTONE) 50 mg, oral, Daily    vitamin E 180 mg (400 units) capsule 400-800 units daily       Active Problem List  Problem List[1]    Comprehensive Medical/Surgical/Social/Family History  Medical History[2]  Surgical History[3]    Social History     Social History Narrative    Social History    , 3 kids (2 living)    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 (Pt's daughter was BRCA tested " and was (-))    MGM:  Breast Cancer  age 70s, CVA age 90s, Uterine CA on Tamoxifen()    B:                                                           S:                                                            D: nephrectomy secondary to hydronephrosis, Hypertension    PGF: DVT after surgery    MGF: CVA and Brain CA ()          [1]   Patient Active Problem List  Diagnosis    Acne, mild    Allergic rhinitis    History of gestational diabetes    Hypertension, essential    Vitamin D deficiency    Routine adult health maintenance    BMI 28.0-28.9,adult    At high risk for breast cancer    Encounter for screening mammogram for breast cancer    History of shingles    Screening for breast cancer using non-mammogram modality    Perimenopause    Screening for cervical cancer   [2]   Past Medical History:  Diagnosis Date    H/O chest x-ray     2017 (-)    H/O diagnostic ultrasound     : IMPRESSION: NEEDLE LOCALIZATION Needle localization for the marker clip in the left breast at 11 o'clock  posterior depth 5.7 cm from the skin to the geometric center of lesion  was successful with no apparent post procedure complications    H/O magnetic resonance imaging     : No MRI evidence of malignancy in either breast. 20: IMPRESSION: BENIGN FINDING  No MRI evidence of malignancy involving either breast.  Follow-up with ACR/NCCN guidelines. 2016: LEFT BREAST: BI-RADS 0 1. Lobular mass in the slightly lower slightly outer breast at a middle  depth. Possible lymph node. Second look ultrasound is recommended.   [3]   Past Surgical History:  Procedure Laterality Date     SECTION, CLASSIC  08/10/2018     Section    COLONOSCOPY      Cscope 21: normal (10yrs)    INCISIONAL BREAST BIOPSY  2019: Left breast, excisional biopsy  - Fibrocystic changes including microcysts, fibrosis, apocrine metaplasia,  and usual ductal hyperplasia :  1. Breast, left, 11 o'clock, MRI-guided core biopsy  with hourglass clip -  Fibrocystic changes including stromal fibrosis, columnar cell change,  apocrine cysts, and a small intraductal papilloma    OTHER SURGICAL HISTORY  08/10/2018    Colposcopy With Loop Electrode Excision Of The Cervix    OTHER SURGICAL HISTORY  08/10/2018    Oral Surgery Tooth Extraction [facility]

## 2025-04-25 NOTE — ASSESSMENT & PLAN NOTE
Annual Wellness exam completed   Preventive Health History reviewed  Ordered:   Labs    Mammogram   MRI Breast  PAP done

## 2025-05-05 ENCOUNTER — APPOINTMENT (OUTPATIENT)
Dept: PRIMARY CARE | Facility: CLINIC | Age: 50
End: 2025-05-05
Payer: COMMERCIAL

## 2025-05-09 ENCOUNTER — TELEPHONE (OUTPATIENT)
Dept: PRIMARY CARE | Facility: CLINIC | Age: 50
End: 2025-05-09
Payer: COMMERCIAL

## 2025-05-09 LAB
CYTOLOGY CMNT CVX/VAG CYTO-IMP: NORMAL
HPV HR 12 DNA GENITAL QL NAA+PROBE: NEGATIVE
HPV HR GENOTYPES PNL CVX NAA+PROBE: POSITIVE
HPV16 DNA SPEC QL NAA+PROBE: POSITIVE
HPV18 DNA SPEC QL NAA+PROBE: NEGATIVE
LAB AP HPV GENOTYPE QUESTION: YES
LAB AP HPV HR: NORMAL
LABORATORY COMMENT REPORT: NORMAL
PATH REPORT.TOTAL CANCER: NORMAL

## 2025-05-09 NOTE — TELEPHONE ENCOUNTER
JUDIE Strickland [place] [id number] Clerical  Pt notified. She verbally understood.    Clerical please call and assist scheduling          Previous Messages       ----- Message -----  From: Hyacinth Ibrahim MD  Sent: 5/9/2025   3:24 PM EDT  To: [place] [id number] Clinical Support Staff    Normal PAP but + HPV 16 (high risk strain)  Needs colposcopy (similar to PAP but microscope used)  GYN does this  Referral in schedule

## 2025-05-09 NOTE — TELEPHONE ENCOUNTER
Patient is currently scheduled for another provider on 5/28. She would like to be seen sooner by Dr. Goodman since it is someone that Dr. Ibrahim recommends. Is there anyway you can get her in sooner?

## 2025-05-13 ENCOUNTER — HOSPITAL ENCOUNTER (OUTPATIENT)
Dept: RADIOLOGY | Facility: HOSPITAL | Age: 50
Discharge: HOME | End: 2025-05-13
Payer: COMMERCIAL

## 2025-05-13 DIAGNOSIS — Z12.39 SCREENING FOR BREAST CANCER USING NON-MAMMOGRAM MODALITY: ICD-10-CM

## 2025-05-13 DIAGNOSIS — Z91.89 AT HIGH RISK FOR BREAST CANCER: ICD-10-CM

## 2025-05-13 PROCEDURE — 2550000001 HC RX 255 CONTRASTS: Performed by: INTERNAL MEDICINE

## 2025-05-13 PROCEDURE — 77049 MRI BREAST C-+ W/CAD BI: CPT

## 2025-05-13 PROCEDURE — A9575 INJ GADOTERATE MEGLUMI 0.1ML: HCPCS | Performed by: INTERNAL MEDICINE

## 2025-05-13 RX ORDER — GADOTERATE MEGLUMINE 376.9 MG/ML
16 INJECTION INTRAVENOUS
Status: COMPLETED | OUTPATIENT
Start: 2025-05-13 | End: 2025-05-13

## 2025-05-13 RX ADMIN — GADOTERATE MEGLUMINE 16 ML: 376.9 INJECTION INTRAVENOUS at 10:20

## 2025-05-28 ENCOUNTER — APPOINTMENT (OUTPATIENT)
Dept: OBSTETRICS AND GYNECOLOGY | Facility: CLINIC | Age: 50
End: 2025-05-28
Payer: COMMERCIAL

## 2025-06-26 ENCOUNTER — APPOINTMENT (OUTPATIENT)
Dept: OBSTETRICS AND GYNECOLOGY | Facility: CLINIC | Age: 50
End: 2025-06-26
Payer: COMMERCIAL

## 2025-06-26 VITALS — WEIGHT: 177 LBS | BODY MASS INDEX: 29.91 KG/M2

## 2025-06-26 DIAGNOSIS — R87.618 PAP SMEAR ABNORMALITY OF CERVIX/HUMAN PAPILLOMAVIRUS (HPV) POSITIVE: ICD-10-CM

## 2025-06-26 DIAGNOSIS — R87.810 HUMAN PAPILLOMAVIRUS (HPV) TYPE 16 DNA DETECTED IN CERVICAL SPECIMEN: ICD-10-CM

## 2025-06-26 LAB — PREGNANCY TEST URINE, POC: NEGATIVE

## 2025-06-26 PROCEDURE — 88305 TISSUE EXAM BY PATHOLOGIST: CPT

## 2025-06-26 ASSESSMENT — PATIENT HEALTH QUESTIONNAIRE - PHQ9
SUM OF ALL RESPONSES TO PHQ9 QUESTIONS 1 & 2: 0
1. LITTLE INTEREST OR PLEASURE IN DOING THINGS: NOT AT ALL
2. FEELING DOWN, DEPRESSED OR HOPELESS: NOT AT ALL

## 2025-06-26 NOTE — PROGRESS NOTES
GYN PROGRESS NOTE          CC:   Abnormal pap smear    HPI:  Mounika Sood is scheduled today for COLPO for abnormal Pap smear.      No new GYN complaints.  Procedure discuss in detail risk benefits alternatives discussed.    Patient answers are not available for this visit.  HPI       Procedure     Additional comments: colposcopy             Comments    Mounika is a  50 year old established patient here today for a colposcopy. Her last pap smear was 4/25/25, results: WNL/ HPV16 +     Pt has hx of LSIL, CIN1, followed up with a laser procedure in 2001. Pt reports paps have been normal and negative for HPV until this year. (Pt brought copies of paper medical records from 2001)    I/O HCG was completed today, see results.    The procedure was explained by both the RN and MD. Pt questions were answered to their satisfaction and Pt's consent obtained.   Time Out completed.  Pathology obtained.    Post-procedure instructions reviewed, Pt verbalized understanding. Copy provided to Pt.   The patient was encouraged to call the office with any questions or concerns.  Chaperone: TOREY Carroll           Last edited by Francine Ayala RN on 6/26/2025  2:24 PM.            ROS:  GEN - no fevers or chills  RESP - no SOB or cough  GYN - see HPI      HISTORY:  Medical History[1]  Surgical History[2]  Social History     Socioeconomic History    Marital status:      Spouse name: Not on file    Number of children: Not on file    Years of education: Not on file    Highest education level: Not on file   Occupational History    Not on file   Tobacco Use    Smoking status: Never    Smokeless tobacco: Never   Vaping Use    Vaping status: Never Used   Substance and Sexual Activity    Alcohol use: Yes     Alcohol/week: 1.0 standard drink of alcohol     Types: 1 Standard drinks or equivalent per week     Comment: per week    Drug use: Never    Sexual activity: Yes     Partners: Male     Birth control/protection: Male Sterilization    Other Topics Concern    Not on file   Social History Narrative    Social History    , 3 kids (2 living)    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 (Pt's daughter was BRCA tested and was (-))    MGM:  Breast Cancer  age 70s, CVA age 90s, Uterine CA on Tamoxifen()    B:                                                           S:                                                            D: nephrectomy secondary to hydronephrosis, Hypertension    PGF: DVT after surgery    MGF: CVA and Brain CA ()     Social Drivers of Health     Financial Resource Strain: Not on file   Food Insecurity: Not on file   Transportation Needs: Not on file   Physical Activity: Not on file   Stress: Not on file   Social Connections: Not on file   Intimate Partner Violence: Not on file   Housing Stability: Not on file     Cancer-related family history includes Breast cancer in her maternal grandmother; Ovarian cancer in her maternal grandmother.       PHYSICAL EXAM:  Wt 80.3 kg (177 lb)   BMI 29.91 kg/m²   GEN:  A&O, NAD  URO:  normal urethral meatus  GYN:  normal vulva and perineum w/o lesions or ulcers, normal vagina without discharge or lesions, normal cervix without lesions or discharge  PSYCH:  normal affect, non-anxious    Procedure colposcopy with ECC  Patient was consented, timeout was performed. Patient was placed in lithotomy position.  Cervix was fully visualized visual inspection was performed squamocolumnar junction was fully visualized acetic acid placed findings include minimal change acetowhite no atypical lesions.  Strong iodine solution placed no nonstaining in atypical areas. Biopsy performed at 3 6 and 9 ECC performed cytology collected with  Cytobrush.  No hemostasis required patient tolerated the procedure well no complications      IMPRESSION/PLAN:    50 y.o. s/p colposcopy      Abnormal pap smear:  Counseling done on abnormal paps and COLPO.  Natural course of HPV and high rates of regression of LGSIL reviewed with patient.  Need for treatment if HGSIL/CIN3 and treatment vs close surveillance with HGSIL/CIN2 discussed.  COLPO done today, patient tolerated well.     F/U for TELE visit in 2-3 weeks for discussion of COLPO results.        Carlos Blandon MD       [1]   Past Medical History:  Diagnosis Date    Abnormal Pap smear of cervix     H/O chest x-ray     2017 (-)    H/O diagnostic ultrasound     : IMPRESSION: NEEDLE LOCALIZATION Needle localization for the marker clip in the left breast at 11 o'clock  posterior depth 5.7 cm from the skin to the geometric center of lesion  was successful with no apparent post procedure complications    H/O magnetic resonance imaging     : No MRI evidence of malignancy in either breast. 20: IMPRESSION: BENIGN FINDING  No MRI evidence of malignancy involving either breast.  Follow-up with ACR/NCCN guidelines. 2016: LEFT BREAST: BI-RADS 0 1. Lobular mass in the slightly lower slightly outer breast at a middle  depth. Possible lymph node. Second look ultrasound is recommended.    H/O magnetic resonance imaging 05/15/2025    MRI Breast: No MRI evidence of malignancy in either breast.    HPV (human papilloma virus) infection    [2]   Past Surgical History:  Procedure Laterality Date    BREAST BIOPSY  2019     SECTION, CLASSIC  08/10/2018     Section     SECTION, LOW TRANSVERSE  ,     COLONOSCOPY      Cscope 21: normal (10yrs)    COLPOSCOPY      GYNECOLOGIC CRYOSURGERY      INCISIONAL BREAST BIOPSY  2019: Left breast, excisional biopsy  - Fibrocystic changes including microcysts, fibrosis, apocrine metaplasia,  and usual ductal  hyperplasia 11/16:  1. Breast, left, 11 o'clock, MRI-guided core biopsy with hourglass clip -  Fibrocystic changes including stromal fibrosis, columnar cell change,  apocrine cysts, and a small intraductal papilloma    OTHER SURGICAL HISTORY  08/10/2018    Colposcopy With Loop Electrode Excision Of The Cervix    OTHER SURGICAL HISTORY  08/10/2018    Oral Surgery Tooth Extraction [facility]

## 2025-06-26 NOTE — LETTER
Colposcopy Aftercare Information     A Colposcopy is used to follow up abnormal Pap and HPV tests results, or abnormal areas seen on the cervix, vagina, or vulva. The colposcope magnifies the appearance of the cervix. Acetic acid solution or vinegar is placed on the cervix and vagina to stain the cells and to allow the clinician to better see where the abnormal cells are located. A tissue sample of those areas is then collected and sent for further testing; this is called a biopsy. The biopsy results will determine how severe the abnormality is and help to determine what treatment, if any, is needed. When monitored and treated early, precancerous areas usually do not develop into cervical cancer.      What to expect:   - Expect vaginal bleeding or spotting for a few days.   - Expect vaginal soreness or cramping for a couple of days. Ibuprofen 600 mg every 6 hours, or acetaminophen 650 mg every 6 hours as needed for the cramping may be helpful.   - Your provider may have used a medication to stop bleeding at the biopsy site. You may see brownish-black discharge for a few days.      How to care for yourself after your procedure:   -You may return to work immediately after the procedure.   -Do not place anything in the vagina for 1 week. This includes sexual intercourse and tampons.     What to watch for:   - Heavy vaginal bleeding where you soak one or more maxi pads in an hour.   - Fever, chills, foul smelling discharge and or severe pelvic pain.       Your pathology results will be ready in 2 to 3 weeks. Your provider or the nurse will call you with the results and plan of care going forward.

## 2025-07-01 LAB
LABORATORY COMMENT REPORT: NORMAL
PATH REPORT.FINAL DX SPEC: NORMAL
PATH REPORT.GROSS SPEC: NORMAL
PATH REPORT.RELEVANT HX SPEC: NORMAL
PATH REPORT.TOTAL CANCER: NORMAL

## 2025-07-17 ENCOUNTER — APPOINTMENT (OUTPATIENT)
Dept: OBSTETRICS AND GYNECOLOGY | Facility: CLINIC | Age: 50
End: 2025-07-17
Payer: COMMERCIAL

## 2025-07-17 DIAGNOSIS — R87.810 HUMAN PAPILLOMAVIRUS (HPV) TYPE 16 DNA DETECTED IN CERVICAL SPECIMEN: Primary | ICD-10-CM

## 2025-07-17 PROCEDURE — 98013 SYNCH AUDIO-ONLY EST LOW 20: CPT | Performed by: OBSTETRICS & GYNECOLOGY

## 2025-07-17 NOTE — PROGRESS NOTES
GYN PROGRESS NOTE    Virtual or Telephone Consent    While technically available, the patient was unable or unwilling to consent to connect via audio/video telehealth technology; therefore, I performed this visit using a real-time audio only connection between Mounika Sood & Carlos Blandon MD.  Verbal consent was requested and obtained from Mounika Sood on this date, 25 for a telehealth visit and the patient's location was confirmed at the time of the visit.  20 minute visit    Chief complaint: follow up    HPI:  Patient answers are not available for this visit.  - Patient is overall doing well and has recovered from the procedure without any issues.      Reviewed case with patient, reviewed plans.    Recommend a repeat pap in 1 year.      HISTORY:  Medical History[1]  Surgical History[2]  Social History     Socioeconomic History    Marital status:      Spouse name: Not on file    Number of children: Not on file    Years of education: Not on file    Highest education level: Not on file   Occupational History    Not on file   Tobacco Use    Smoking status: Never    Smokeless tobacco: Never   Vaping Use    Vaping status: Never Used   Substance and Sexual Activity    Alcohol use: Yes     Alcohol/week: 1.0 standard drink of alcohol     Types: 1 Standard drinks or equivalent per week     Comment: per week    Drug use: Never    Sexual activity: Yes     Partners: Male     Birth control/protection: Male Sterilization   Other Topics Concern    Not on file   Social History Narrative    Social History    , 3 kids (2 living)    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 (Pt's daughter was BRCA tested and was (-))    MGM:  Breast Cancer   age 70s, CVA age 90s, Uterine CA on Tamoxifen()    B:                                                           S:                                                            D: nephrectomy secondary to hydronephrosis, Hypertension    PGF: DVT after surgery    MGF: CVA and Brain CA ()     Social Drivers of Health     Financial Resource Strain: Not on file   Food Insecurity: Not on file   Transportation Needs: Not on file   Physical Activity: Not on file   Stress: Not on file   Social Connections: Not on file   Intimate Partner Violence: Not on file   Housing Stability: Not on file     Cancer-related family history includes Breast cancer in her maternal grandmother; Ovarian cancer in her maternal grandmother.       IMPRESSION/PLAN:  50 y.o. HPV 16 positive.    Follow up in 1 year for repeat pap smear.    [place] am scribing for virtually, and in the presence of Dr. Carlos Blandon on  25 at 7:07 PM     Carlos Blandon MD        [1]   Past Medical History:  Diagnosis Date    Abnormal Pap smear of cervix     H/O chest x-ray     2017 (-)    H/O diagnostic ultrasound     : IMPRESSION: NEEDLE LOCALIZATION Needle localization for the marker clip in the left breast at 11 o'clock  posterior depth 5.7 cm from the skin to the geometric center of lesion  was successful with no apparent post procedure complications    H/O magnetic resonance imaging     : No MRI evidence of malignancy in either breast. 20: IMPRESSION: BENIGN FINDING  No MRI evidence of malignancy involving either breast.  Follow-up with ACR/NCCN guidelines. 2016: LEFT BREAST: BI-RADS 0 1. Lobular mass in the slightly lower slightly outer breast at a middle  depth. Possible lymph node. Second look ultrasound is recommended.    H/O magnetic resonance imaging 05/15/2025    MRI Breast: No MRI evidence of malignancy in either breast.    HPV (human papilloma virus) infection    [2]   Past Surgical History:  Procedure  Laterality Date    BREAST BIOPSY  2019     SECTION, CLASSIC  08/10/2018     Section     SECTION, LOW TRANSVERSE  ,     COLONOSCOPY      Cscope 21: normal (10yrs)    COLPOSCOPY      GYNECOLOGIC CRYOSURGERY      INCISIONAL BREAST BIOPSY  2019: Left breast, excisional biopsy  - Fibrocystic changes including microcysts, fibrosis, apocrine metaplasia,  and usual ductal hyperplasia :  1. Breast, left, 11 o'clock, MRI-guided core biopsy with hourglass clip -  Fibrocystic changes including stromal fibrosis, columnar cell change,  apocrine cysts, and a small intraductal papilloma    OTHER SURGICAL HISTORY  08/10/2018    Colposcopy With Loop Electrode Excision Of The Cervix    OTHER SURGICAL HISTORY  08/10/2018    Oral Surgery Tooth Extraction [facility]

## 2026-05-12 ENCOUNTER — APPOINTMENT (OUTPATIENT)
Dept: PRIMARY CARE | Facility: CLINIC | Age: 51
End: 2026-05-12
Payer: COMMERCIAL

## 2026-05-15 ENCOUNTER — APPOINTMENT (OUTPATIENT)
Dept: PRIMARY CARE | Facility: CLINIC | Age: 51
End: 2026-05-15
Payer: COMMERCIAL

## 2026-08-10 ENCOUNTER — APPOINTMENT (OUTPATIENT)
Dept: OBSTETRICS AND GYNECOLOGY | Facility: CLINIC | Age: 51
End: 2026-08-10
Payer: COMMERCIAL